# Patient Record
(demographics unavailable — no encounter records)

---

## 2025-07-28 NOTE — END OF VISIT
[FreeTextEntry3] : This note was written by Armando Alonso on 07/28/2025 acting solely as a scribe for Dr. Juan Diego Rincon.   All medical record entries made by the Scribe were at my, Dr. Juan Diego Rincon, direction and personally dictated by me on 07/28/2025. I have personally reviewed the chart and agree that the record accurately reflects my personal performance of the history, physical exam, assessment and plan.  84

## 2025-07-28 NOTE — HISTORY OF PRESENT ILLNESS
[Right] : right hand dominant [FreeTextEntry1] : He comes in today for evaluation of right wrist and hand pain that has been ongoing for 1 month after playing with his son. He reports weakness and rates his pain as a 6-7/10. He denies numbness or swelling.  He has Hepatitis B.

## 2025-07-28 NOTE — DISCUSSION/SUMMARY
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  I had a discussion with the patient regarding today's visit, the prognosis of this diagnosis, and treatment recommendations and options. At this time,  I recommended he begin a course of Mobic 15 mg once daily with meals. I warned about potential GI side effects. I also recommended bracing as needed for support. A right carpal tunnel splint was provided today. If his symptoms have not improved in 2 weeks, he will call my office and I will further image him with an MRI.  The patient has agreed to the above plan of management and has expressed full understanding.  All questions were fully answered to the patient's satisfaction.  My cumulative time spent on this visit included: Preparation for the visit, review of the medical records, review of pertinent diagnostic studies, examination and counseling of the patient on the above diagnosis, treatment plan and prognosis, orders of diagnostic tests, medication and/or appropriate procedures and documentation in the medical records of today's visit.

## 2025-07-28 NOTE — ADDENDUM
[FreeTextEntry1] :  I, Armando Alonso, acted solely as a scribe for Dr. Rincon on this date on 07/28/2025.

## 2025-07-28 NOTE — PHYSICAL EXAM
[de-identified] : - Constitutional: This is a male in no obvious distress.   - Psych: Patient is alert and oriented to person, place and time.  Patient has a normal mood and affect.  - Cardiovascular: Normal pulses throughout the upper extremities.  No significant varicosities are noted in the upper extremities.   Examination of his right wrist and hand demonstrates no obvious swelling.  However, there is prominence dorsally of the distal ulna.  He has tenderness along the ECU tendon.  There is no instability of the ECU tendon with pronation supination.  There is no tenderness along the DRUJ, tenderness along the TFCC ligament or instability of the DRUJ.  He is neurovascularly intact distally. [de-identified] :  PA, lateral, PA  radiographs of the right wrist demonstrate no arthritis, positive ulnar variance which increases with the  view and what appears to be dorsal subluxation of the distal ulna on the lateral.